# Patient Record
Sex: MALE | Race: WHITE | Employment: UNEMPLOYED | URBAN - NONMETROPOLITAN AREA
[De-identification: names, ages, dates, MRNs, and addresses within clinical notes are randomized per-mention and may not be internally consistent; named-entity substitution may affect disease eponyms.]

---

## 2017-07-24 ENCOUNTER — HOSPITAL ENCOUNTER (OUTPATIENT)
Dept: NEUROLOGY | Age: 47
Discharge: HOME | End: 2017-07-24
Payer: MEDICAID

## 2017-07-24 DIAGNOSIS — R20.2 NUMBNESS AND TINGLING IN BOTH HANDS: ICD-10-CM

## 2017-07-24 DIAGNOSIS — M54.2 PAIN, NECK: ICD-10-CM

## 2017-07-24 DIAGNOSIS — R53.1 WEAKNESS: ICD-10-CM

## 2017-07-24 DIAGNOSIS — R20.0 NUMBNESS AND TINGLING IN BOTH HANDS: ICD-10-CM

## 2017-07-24 PROCEDURE — 95886 MUSC TEST DONE W/N TEST COMP: CPT | Performed by: PSYCHIATRY & NEUROLOGY

## 2017-07-24 PROCEDURE — 95911 NRV CNDJ TEST 9-10 STUDIES: CPT

## 2017-07-24 PROCEDURE — 95886 MUSC TEST DONE W/N TEST COMP: CPT

## 2017-07-24 PROCEDURE — 95911 NRV CNDJ TEST 9-10 STUDIES: CPT | Performed by: PSYCHIATRY & NEUROLOGY

## 2017-09-06 ENCOUNTER — TRANSCRIBE ORDERS (OUTPATIENT)
Dept: ADMINISTRATIVE | Facility: HOSPITAL | Age: 47
End: 2017-09-06

## 2017-09-06 DIAGNOSIS — M54.12 CERVICAL RADICULOPATHY: ICD-10-CM

## 2017-09-06 DIAGNOSIS — M54.2 NECK PAIN: Primary | ICD-10-CM

## 2017-09-13 ENCOUNTER — HOSPITAL ENCOUNTER (OUTPATIENT)
Dept: CT IMAGING | Facility: HOSPITAL | Age: 47
Discharge: HOME | End: 2017-09-13
Admitting: PHYSICIAN ASSISTANT

## 2017-09-13 ENCOUNTER — HOSPITAL ENCOUNTER (OUTPATIENT)
Dept: MRI IMAGING | Facility: HOSPITAL | Age: 47
Discharge: HOME | End: 2017-09-13

## 2017-09-13 DIAGNOSIS — M54.12 CERVICAL RADICULOPATHY: ICD-10-CM

## 2017-09-13 DIAGNOSIS — M54.2 NECK PAIN: ICD-10-CM

## 2017-09-13 PROCEDURE — 72141 MRI NECK SPINE W/O DYE: CPT

## 2017-09-13 PROCEDURE — 72125 CT NECK SPINE W/O DYE: CPT

## 2018-05-26 ENCOUNTER — HOSPITAL ENCOUNTER (OUTPATIENT)
Dept: HOSPITAL 58 - AMBL | Age: 48
Discharge: ANOTHER HOSPITAL | End: 2018-05-26
Attending: INTERNAL MEDICINE

## 2018-05-26 ENCOUNTER — HOSPITAL ENCOUNTER (EMERGENCY)
Facility: HOSPITAL | Age: 48
Discharge: HOME | End: 2018-05-26
Admitting: EMERGENCY MEDICINE

## 2018-05-26 ENCOUNTER — APPOINTMENT (OUTPATIENT)
Dept: CT IMAGING | Facility: HOSPITAL | Age: 48
End: 2018-05-26

## 2018-05-26 VITALS
DIASTOLIC BLOOD PRESSURE: 65 MMHG | TEMPERATURE: 97.2 F | SYSTOLIC BLOOD PRESSURE: 119 MMHG | HEART RATE: 90 BPM | OXYGEN SATURATION: 96 % | HEIGHT: 67 IN | WEIGHT: 207.2 LBS | RESPIRATION RATE: 18 BRPM | BODY MASS INDEX: 32.52 KG/M2

## 2018-05-26 VITALS — BODY MASS INDEX: 33 KG/M2

## 2018-05-26 DIAGNOSIS — R00.0: ICD-10-CM

## 2018-05-26 DIAGNOSIS — R53.1: Primary | ICD-10-CM

## 2018-05-26 DIAGNOSIS — R55 NEAR SYNCOPE: Primary | ICD-10-CM

## 2018-05-26 DIAGNOSIS — R42: ICD-10-CM

## 2018-05-26 LAB
ALBUMIN SERPL-MCNC: 4.1 G/DL (ref 3.5–5)
ALBUMIN/GLOB SERPL: 1.5 G/DL (ref 1.1–2.5)
ALP SERPL-CCNC: 78 U/L (ref 24–120)
ALT SERPL W P-5'-P-CCNC: 22 U/L (ref 0–54)
AMPHET+METHAMPHET UR QL: NEGATIVE
ANION GAP SERPL CALCULATED.3IONS-SCNC: 9 MMOL/L (ref 4–13)
AST SERPL-CCNC: 15 U/L (ref 7–45)
BARBITURATES UR QL SCN: NEGATIVE
BASOPHILS # BLD AUTO: 0.06 10*3/MM3 (ref 0–0.2)
BASOPHILS NFR BLD AUTO: 0.7 % (ref 0–2)
BENZODIAZ UR QL SCN: NEGATIVE
BILIRUB SERPL-MCNC: 0.4 MG/DL (ref 0.1–1)
BILIRUB UR QL STRIP: NEGATIVE
BUN BLD-MCNC: 22 MG/DL (ref 5–21)
BUN/CREAT SERPL: 18.2 (ref 7–25)
CALCIUM SPEC-SCNC: 9.5 MG/DL (ref 8.4–10.4)
CANNABINOIDS SERPL QL: NEGATIVE
CHLORIDE SERPL-SCNC: 99 MMOL/L (ref 98–110)
CLARITY UR: CLEAR
CO2 SERPL-SCNC: 31 MMOL/L (ref 24–31)
COCAINE UR QL: NEGATIVE
COLOR UR: YELLOW
CREAT BLD-MCNC: 1.21 MG/DL (ref 0.5–1.4)
DEPRECATED RDW RBC AUTO: 38.8 FL (ref 40–54)
EOSINOPHIL # BLD AUTO: 0.28 10*3/MM3 (ref 0–0.7)
EOSINOPHIL NFR BLD AUTO: 3.1 % (ref 0–4)
ERYTHROCYTE [DISTWIDTH] IN BLOOD BY AUTOMATED COUNT: 12.4 % (ref 12–15)
GFR SERPL CREATININE-BSD FRML MDRD: 63 ML/MIN/1.73
GLOBULIN UR ELPH-MCNC: 2.8 GM/DL
GLUCOSE BLD-MCNC: 83 MG/DL (ref 70–100)
GLUCOSE UR STRIP-MCNC: ABNORMAL MG/DL
HCT VFR BLD AUTO: 43.7 % (ref 40–52)
HGB BLD-MCNC: 15.4 G/DL (ref 14–18)
HGB UR QL STRIP.AUTO: NEGATIVE
HOLD SPECIMEN: NORMAL
HOLD SPECIMEN: NORMAL
IMM GRANULOCYTES # BLD: 0.04 10*3/MM3 (ref 0–0.03)
IMM GRANULOCYTES NFR BLD: 0.4 % (ref 0–5)
KETONES UR QL STRIP: NEGATIVE
LEUKOCYTE ESTERASE UR QL STRIP.AUTO: NEGATIVE
LYMPHOCYTES # BLD AUTO: 2.61 10*3/MM3 (ref 0.72–4.86)
LYMPHOCYTES NFR BLD AUTO: 28.6 % (ref 15–45)
MAGNESIUM SERPL-MCNC: 2.1 MG/DL (ref 1.4–2.2)
MCH RBC QN AUTO: 30.4 PG (ref 28–32)
MCHC RBC AUTO-ENTMCNC: 35.2 G/DL (ref 33–36)
MCV RBC AUTO: 86.4 FL (ref 82–95)
METHADONE UR QL SCN: NEGATIVE
MONOCYTES # BLD AUTO: 0.59 10*3/MM3 (ref 0.19–1.3)
MONOCYTES NFR BLD AUTO: 6.5 % (ref 4–12)
NEUTROPHILS # BLD AUTO: 5.55 10*3/MM3 (ref 1.87–8.4)
NEUTROPHILS NFR BLD AUTO: 60.7 % (ref 39–78)
NITRITE UR QL STRIP: NEGATIVE
NRBC BLD MANUAL-RTO: 0 /100 WBC (ref 0–0)
OPIATES UR QL: NEGATIVE
PCP UR QL SCN: NEGATIVE
PH UR STRIP.AUTO: 6 [PH] (ref 5–8)
PLATELET # BLD AUTO: 194 10*3/MM3 (ref 130–400)
PMV BLD AUTO: 11.8 FL (ref 6–12)
POTASSIUM BLD-SCNC: 3.4 MMOL/L (ref 3.5–5.3)
PROT SERPL-MCNC: 6.9 G/DL (ref 6.3–8.7)
PROT UR QL STRIP: NEGATIVE
RBC # BLD AUTO: 5.06 10*6/MM3 (ref 4.8–5.9)
SODIUM BLD-SCNC: 139 MMOL/L (ref 135–145)
SP GR UR STRIP: 1.02 (ref 1–1.03)
TROPONIN I SERPL-MCNC: <0.012 NG/ML (ref 0–0.03)
TSH SERPL DL<=0.05 MIU/L-ACNC: 2.04 MIU/ML (ref 0.47–4.68)
UROBILINOGEN UR QL STRIP: ABNORMAL
WBC NRBC COR # BLD: 9.13 10*3/MM3 (ref 4.8–10.8)
WHOLE BLOOD HOLD SPECIMEN: NORMAL
WHOLE BLOOD HOLD SPECIMEN: NORMAL

## 2018-05-26 PROCEDURE — 99285 EMERGENCY DEPT VISIT HI MDM: CPT

## 2018-05-26 PROCEDURE — 80307 DRUG TEST PRSMV CHEM ANLYZR: CPT | Performed by: PHYSICIAN ASSISTANT

## 2018-05-26 PROCEDURE — 84443 ASSAY THYROID STIM HORMONE: CPT | Performed by: PHYSICIAN ASSISTANT

## 2018-05-26 PROCEDURE — 80053 COMPREHEN METABOLIC PANEL: CPT | Performed by: PHYSICIAN ASSISTANT

## 2018-05-26 PROCEDURE — 85025 COMPLETE CBC W/AUTO DIFF WBC: CPT | Performed by: PHYSICIAN ASSISTANT

## 2018-05-26 PROCEDURE — 83735 ASSAY OF MAGNESIUM: CPT | Performed by: PHYSICIAN ASSISTANT

## 2018-05-26 PROCEDURE — 93010 ELECTROCARDIOGRAM REPORT: CPT | Performed by: INTERNAL MEDICINE

## 2018-05-26 PROCEDURE — 70450 CT HEAD/BRAIN W/O DYE: CPT

## 2018-05-26 PROCEDURE — 84484 ASSAY OF TROPONIN QUANT: CPT | Performed by: PHYSICIAN ASSISTANT

## 2018-05-26 PROCEDURE — 81003 URINALYSIS AUTO W/O SCOPE: CPT | Performed by: PHYSICIAN ASSISTANT

## 2018-05-26 PROCEDURE — 93005 ELECTROCARDIOGRAM TRACING: CPT

## 2018-05-26 RX ORDER — SODIUM CHLORIDE 0.9 % (FLUSH) 0.9 %
10 SYRINGE (ML) INJECTION AS NEEDED
Status: DISCONTINUED | OUTPATIENT
Start: 2018-05-26 | End: 2018-05-27 | Stop reason: HOSPADM

## 2018-05-26 RX ORDER — GABAPENTIN 300 MG/1
300 CAPSULE ORAL ONCE
COMMUNITY

## 2018-05-26 RX ORDER — HYDROCHLOROTHIAZIDE 25 MG/1
25 TABLET ORAL DAILY
COMMUNITY

## 2018-05-26 RX ORDER — LISINOPRIL 20 MG/1
20 TABLET ORAL DAILY
COMMUNITY

## 2018-05-26 RX ORDER — DULOXETIN HYDROCHLORIDE 30 MG/1
30 CAPSULE, DELAYED RELEASE ORAL DAILY
COMMUNITY

## 2018-05-26 RX ORDER — ATORVASTATIN CALCIUM 20 MG/1
20 TABLET, FILM COATED ORAL DAILY
COMMUNITY

## 2018-06-04 NOTE — ED NOTES
"ED Call Back Questions    1. How are you doing since leaving the Emergency Department?    Doing better.  Will schedule with PCP.  Good ER visit.  2. Do you have any questions about your discharge instructions? No     3. Have you filled your new prescriptions yet? N/A  a. Do you have any questions about those medications? N/A    4. Were you able to make a follow-up appointment with the physician? No     5. Do you have a primary care physician? Yes   a. If No, would you like for me to set you up with one? No   i. If Yes, “I will have our ED  give you a call right back at this number to work with you on the best time for an appointment.”    6. We are always looking to get better at what we do. Do you have any suggestions for what we can do to be even better? No   a. If Yes, \"Thank you for sharing your concerns. I apologize. I will follow up with our manager and patient . Would you like someone to call you back?\" N/A    7. Is there anything else I can do for you? No     "

## 2018-10-19 ENCOUNTER — HOSPITAL ENCOUNTER (EMERGENCY)
Dept: HOSPITAL 58 - ED | Age: 48
Discharge: HOME | End: 2018-10-19

## 2018-10-19 VITALS — SYSTOLIC BLOOD PRESSURE: 100 MMHG | TEMPERATURE: 98.6 F | DIASTOLIC BLOOD PRESSURE: 67 MMHG

## 2018-10-19 VITALS — BODY MASS INDEX: 32 KG/M2

## 2018-10-19 DIAGNOSIS — R53.83: Primary | ICD-10-CM

## 2018-10-19 DIAGNOSIS — R53.1: ICD-10-CM

## 2018-10-19 DIAGNOSIS — Z79.899: ICD-10-CM

## 2018-10-19 PROCEDURE — 99283 EMERGENCY DEPT VISIT LOW MDM: CPT

## 2018-10-19 PROCEDURE — 80053 COMPREHEN METABOLIC PANEL: CPT

## 2018-10-19 PROCEDURE — 93005 ELECTROCARDIOGRAM TRACING: CPT

## 2018-10-19 PROCEDURE — 36415 COLL VENOUS BLD VENIPUNCTURE: CPT

## 2018-10-19 PROCEDURE — 85025 COMPLETE CBC W/AUTO DIFF WBC: CPT

## 2018-10-19 PROCEDURE — 84443 ASSAY THYROID STIM HORMONE: CPT

## 2018-10-19 PROCEDURE — 80306 DRUG TEST PRSMV INSTRMNT: CPT

## 2018-10-19 PROCEDURE — 83036 HEMOGLOBIN GLYCOSYLATED A1C: CPT

## 2018-10-19 PROCEDURE — 81001 URINALYSIS AUTO W/SCOPE: CPT

## 2018-10-19 PROCEDURE — 93010 ELECTROCARDIOGRAM REPORT: CPT

## 2019-04-07 NOTE — ED.PDOC
General


ED Provider: 


Dr. ADOLFO BLOCK-ER





Chief Complaint: Non-specific Complaint


Stated Complaint: my bp was up at home---


Time Seen by Physician: 19:20


Mode of Arrival: Walk-In


Information Source: Patient


Exam Limitations: No limitations


Primary Care Provider: 


SAEID WHITMORE





Nursing and Triage Documentation Reviewed and Agree: Yes


Does patient meet sepsis criteria?: No


System Inflammatory Response Syndrome: Not Applicable


Sepsis Protocol: 


For patient's 13 years and over:





Temp is 96.8 and below  and greater


Pulse >90 BPM


Resp >20/minute


Acutely Altered Mental Status





Are patient's symptoms suggestive of a new infection, such as:


   -Pneumonia


   -Skin, Soft Tissue


   -Endocarditis


   -UTI


   -Bone, Joint Infection


   -Implantable Device


   -Acute Abdominal Infection


   -Wound Infection


   -Meningitis


   -Blood Stream Catheter Infection


   -Unknown








Cardiovascular Complaint Exam





- Hypertension Complaint/Exam


Onset/Duration: today


Symptoms Are: Still present


Aggravating: Reports: None


Associated Signs and Symptoms: Reports: Anxiety.  Denies: Chest pain, Vision 

changes, Recent stress, Headache, Numbness, Tingling, Weakness, Dizziness, 

Short of air, Swelling


Recent Change in Medications: No


A/V [place]: No


Papilledema Present: No


JVD Present: No


Carotid Bruit Present: No


Femoral Pulses Bounding: No


Differential Diagnoses: Hypertension


Quality Indicator For Non-Traumatic Chest Pain/Syncope: EKG Performed





Review of Systems





- Review Of Systems


Constitutional: Reports: No symptoms


Eyes: Reports: No symptoms


Ears, Nose, Mouth, Throat: Reports: No symptoms


Respiratory: Reports: No symptoms


Cardiac: Reports: No symptoms


GI: Reports: No symptoms


: Reports: No symptoms


Musculoskeletal: Reports: No symptoms


Skin: Reports: No symptoms


Neurological: Reports: No symptoms


Endocrine: Reports: No symptoms


Hematologic/Lymphatic: Reports: No symptoms


All Other Systems: Reviewed and Negative





Past Medical History





- Past Medical History


Previously Healthy: No


Endocrine: Reports: None


Cardiovascular: Reports: None


Respiratory: Reports: Other


Hematological: Reports: None


Gastrointestinal: Reports: None


Genitourinary: Reports: None


Neuro/Psych: Reports: None


Musculoskeletal: Reports: Unknown


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


Other Pertinent Past Medical History: cancer





- Surgical History


General Surgical History: Reports: Tonsillectomy, Orthopedic (neck surgery), 

Other (EAR SURGERY)





- Family History


Family History: Reports: Unknown





- Social History


Smoking Status: Never smoker, Dips snuff


Hx Substance Use: No


Alcohol Screening: Occasionally





- Immunizations


Tetanus Shot up to Date: No (UNKNOWN)





Physical Exam





- Physical Exam


Appearance: Well-appearing, No pain distress, Well-nourished


Eyes: MOE, EOMI, Conjunctiva clear


ENT: Ears normal, Nose normal, Oropharynx normal


Neck: Supple


Respiratory: Airway patent, Breath sounds clear, Breath sounds equal, 

Respirations nonlabored


Cardiovascular: RRR, Pulses normal, No rub, No murmur


GI/: Soft


Musculoskeletal: Normal strength, ROM intact, No edema, No calf tenderness


Skin: Warm, Dry, Normal color


Neurological: Sensation intact, Motor intact, Reflexes intact, Cranial nerves 

intact, Alert, Oriented


Psychiatric: Affect appropriate, Mood appropriate





Interpretation





- EKG Interpretation


Time of EKG #1: 20:50


Rate: Normal


Rhythm: Sinus


Ectopy: None


Axis: NL


ST Segment: Normal


Interpretation: nsr





Critical Care Note





- Critical Care Note


Total Time (mins): 0





Course





- Course


Hematology/Chemistry: 


 04/07/19 19:50





 04/07/19 19:50


Orders, Labs, Meds: 





Lab Review











  04/07/19 04/07/19 04/07/19





  19:35 19:45 19:50


 


WBC    12.17 H


 


RBC    5.09


 


Hgb    15.2


 


Hct    44.1


 


MCV    86.6


 


MCH    29.9


 


MCHC    34.5


 


RDW Coeff of Ann    12.0


 


Plt Count    219


 


Immature Gran % (Auto)    0.2


 


Neut % (Auto)    58.8


 


Lymph % (Auto)    32.9


 


Mono % (Auto)    5.8


 


Eos % (Auto)    1.9


 


Baso % (Auto)    0.4


 


Immature Gran # (Auto)    0.0


 


Neut # (Auto)    7.2 H


 


Lymph # (Auto)    4.0 H


 


Mono # (Auto)    0.7


 


Eos # (Auto)    0.2


 


Baso # (Auto)    0.1


 


Sodium   


 


Potassium   


 


Chloride   


 


Carbon Dioxide   


 


Anion Gap   


 


BUN   


 


Creatinine   


 


Estimated GFR (MDRD)   


 


BUN/Creatinine Ratio   


 


Glucose   


 


Calcium   


 


Total Bilirubin   


 


AST   


 


ALT   


 


Alkaline Phosphatase   


 


Total Creatine Kinase   


 


Troponin I   


 


Total Protein   


 


Albumin   


 


Globulin   


 


Albumin/Globulin Ratio   


 


TSH   


 


Free T4   


 


Urine Color   Yellow 


 


Urine Clarity   Clear 


 


Urine pH   5.5 


 


Ur Specific Gravity   >=1.030 


 


Urine Protein   Trace 


 


Urine Glucose (UA)   Negative 


 


Urine Ketones   Trace 


 


Urine Blood   Negative 


 


Urine Nitrite   Negative 


 


Urine Bilirubin   Negative 


 


Urine Urobilinogen   0.2 


 


Ur Leukocyte Esterase   Negative 


 


Urine Microscopic WBC   0-2 


 


Ur Squamous Epith Cells   Not present 


 


Hyaline Casts   5-10 


 


Urine Mucus   Trace 


 


Influ A Molecular Assay  Negative by naat  


 


Influ B Molecular Assay  Negative by naat  














  04/07/19 04/07/19





  19:50 19:50


 


WBC  


 


RBC  


 


Hgb  


 


Hct  


 


MCV  


 


MCH  


 


MCHC  


 


RDW Coeff of Ann  


 


Plt Count  


 


Immature Gran % (Auto)  


 


Neut % (Auto)  


 


Lymph % (Auto)  


 


Mono % (Auto)  


 


Eos % (Auto)  


 


Baso % (Auto)  


 


Immature Gran # (Auto)  


 


Neut # (Auto)  


 


Lymph # (Auto)  


 


Mono # (Auto)  


 


Eos # (Auto)  


 


Baso # (Auto)  


 


Sodium  135.4 


 


Potassium  3.40 L 


 


Chloride  98.8 


 


Carbon Dioxide  25.5 


 


Anion Gap  14.50 


 


BUN  17.9 


 


Creatinine  1.19 H 


 


Estimated GFR (MDRD)  64.00 


 


BUN/Creatinine Ratio  15.04 


 


Glucose  88.2 


 


Calcium  9.67 


 


Total Bilirubin  0.42 


 


AST  11.4 L 


 


ALT  14.8 


 


Alkaline Phosphatase  86.7 


 


Total Creatine Kinase  99.4 


 


Troponin I  < 0.012 


 


Total Protein  7.00 


 


Albumin  4.78 


 


Globulin  2.22 


 


Albumin/Globulin Ratio  2.15 


 


TSH  1.390 


 


Free T4   1.06


 


Urine Color  


 


Urine Clarity  


 


Urine pH  


 


Ur Specific Gravity  


 


Urine Protein  


 


Urine Glucose (UA)  


 


Urine Ketones  


 


Urine Blood  


 


Urine Nitrite  


 


Urine Bilirubin  


 


Urine Urobilinogen  


 


Ur Leukocyte Esterase  


 


Urine Microscopic WBC  


 


Ur Squamous Epith Cells  


 


Hyaline Casts  


 


Urine Mucus  


 


Influ A Molecular Assay  


 


Influ B Molecular Assay  








Orders











 Category Date Time Status


 


 EKG-(ED ONLY) Stat CARDIO  04/07/19 19:32 Ordered


 


 Cardiac Monitor [ED CARDIAC MONITOR APPLIED] .ONCE EMERGENCY  04/07/19 19:32 

Active


 


 CBC W/ AUTO DIFF Stat LAB  04/07/19 19:50 Completed


 


 COMPREHENSIVE METABOLIC PANEL Stat LAB  04/07/19 19:50 Completed


 


 CREATINE KINASE Stat LAB  04/07/19 19:50 Completed


 


 FLU A/B MOLECULAR Stat LAB  04/07/19 19:35 Completed


 


 FREE T4 (FREE THYROXINE) Stat LAB  04/07/19 19:50 Completed


 


 MOLECULAR GROUP A STREP Stat LAB  04/07/19 19:45 Completed


 


 TROPONIN I Stat LAB  04/07/19 19:50 Completed


 


 TSH [THYROID STIMULATING HORMONE] Stat LAB  04/07/19 19:50 Completed


 


 URINALYSIS C & S IF INDICATED Stat LAB  04/07/19 19:45 Completed











Vital Signs: 





 











  Temp Pulse Resp BP Pulse Ox


 


 04/07/19 19:16  97.4 F L  102 H  18  117/78  95














BLACK Risk Score


BLACK Risk Score: 


Risk Score      Odds of death by 30D


      0                 0.1 (0.1-0.2)


      1                 0.3 (0.2-0.3)


      2                 0.4 (0.3-0.5)


      3                 0.7 (0.6-0.9)


      4                 1.2 (1.0-1.5)


      5                 2.2 (1.9-2.6)


      6                 3.0 (2.5-3.6)


      7                 4.8 (3.8-6.1)








Departure





- Departure


Time of Disposition: 20:51


Disposition: HOME SELF-CARE


Discharge Problem: 


 Elevated blood pressure reading





Instructions:  Chronic Hypertension (ED)


Condition: Good


Pt referred to PMD for follow-up: Yes


IPMP verified?: No


Additional Instructions: 


monitor bp at home---f/u with pcp


Allergies/Adverse Reactions: 


Allergies





mold Adverse Reaction (Uncoded 04/07/19 19:26)


 








Home Medications: 


Ambulatory Orders





Gabapentin 400 mg PO TID PRN 07/13/15 


Atorvastatin Calcium [Lipitor] 20 mg PO DAILY 10/19/18 


Clonazepam [Klonopin] 1 mg PO BEDTIME 12/19/18 


Duloxetine HCl 60 mg PO BEDTIME 12/19/18 


Hydroxyzine HCl 1 - 2 mg PO BEDTIME PRN #30 tab-cap 12/19/18 


Lisinopril 20 mg PO DAILY 12/19/18 


Ropinirole HCl [Ropinirole Er] 1 mg PO BEDTIME 12/19/18 


Dextroamphetamine/Amphetamine [Adderall 30 mg Tablet] 15 mg PO BID 04/07/19 








Disposition Discussed With: Patient, Family

## 2020-10-11 NOTE — ED.PDOC
General


ED Provider: 


Dr. ADOLFO BLOCK-ER





Chief Complaint: Non-specific Complaint


Stated Complaint: mandy been tired for a month


Time Seen by Physician: 21:22


Mode of Arrival: Walk-In


Information Source: Patient


Exam Limitations: No limitations


Primary Care Provider: 


NATALIA MYERS





Nursing and Triage Documentation Reviewed and Agree: Yes


Does patient meet sepsis criteria?: No


System Inflammatory Response Syndrome: Not Applicable


Sepsis Protocol: 


For patient's 13 years and over:





Temp is 96.8 and below  and greater


Pulse >90 BPM


Resp >20/minute


Acutely Altered Mental Status





Are patient's symptoms suggestive of a new infection, such as:


   -Pneumonia


   -Skin, Soft Tissue


   -Endocarditis


   -UTI


   -Bone, Joint Infection


   -Implantable Device


   -Acute Abdominal Infection


   -Wound Infection


   -Meningitis


   -Blood Stream Catheter Infection


   -Unknown








Miscellaneous Complaint Exam





- Complex/Multi-System Complaint/Exam


Onset/Duration: 4 weeks


Symptoms Are: Still present


Initial Severity: Mild


Current Severity: Mild


Location of Pain: no pain


Associated Signs and Symptoms: Reports: Weakness.  Denies: Decreased 

responsiveness, Confusion, Agitation, Dizziness, Syncope, Headache, Short of air

, Cough, Wheezing, Hemoptysis, Chest pain, Palpitations, Edema, Nausea, Vomiting

, Diarrhea, Abdominal pain, Back pain, Dysuria, Hematemesis, Melena, Decreased 

oral intake, Fever, Diaphoresis, Immunocompromised, Anticoagulation Therapy, 

Recent medication changes, Indwelling medical device, Prior MRSA, Prior VRE, 

Recent trauma, Remote trauma


Recent Echo/LV Function: No


JVD Present: No


Tachypnea Present: No


Stridor Present: No


Abdominal Findings: Present: Normal findings


Glascow Coma Scale (see protocol): 15


Meningeal Signs Positive: No


Focal Weakness: Present: None


Focal Sensory Loss: Present: None


Gait: Normal


Gag Reflex Present: Yes


Babinski Sign: Negative Right, Negative Left


Skin Findings: Present: Normal findings


Joint Swelling Present: No


In-Dwelling Device Present: No


Quality Indicator For Non-Traumatic Chest Pain/Syncope: EKG Performed





Review of Systems





- Review Of Systems


Constitutional: Reports: No symptoms


Eyes: Reports: No symptoms


Ears, Nose, Mouth, Throat: Reports: No symptoms


Respiratory: Reports: No symptoms, Other (snoring)


Cardiac: Reports: No symptoms


GI: Reports: No symptoms


: Reports: No symptoms


Musculoskeletal: Reports: No symptoms


Skin: Reports: No symptoms


Neurological: Reports: No symptoms


Endocrine: Reports: No symptoms


Hematologic/Lymphatic: Reports: No symptoms


All Other Systems: Reviewed and Negative





Past Medical History





- Past Medical History


Previously Healthy: No


Endocrine: Reports: None


Cardiovascular: Reports: None


Respiratory: Reports: Other


Hematological: Reports: None


Gastrointestinal: Reports: None


Genitourinary: Reports: None


Neuro/Psych: Reports: None


Musculoskeletal: Reports: Unknown


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


Other Pertinent Past Medical History: cancer





- Surgical History


General Surgical History: Reports: Tonsillectomy, Orthopedic (neck surgery), 

Other (EAR SURGERY)





- Family History


Family History: Reports: Unknown





- Social History


Smoking Status: Never smoker


Hx Substance Use: No


Alcohol Screening: Occasionally





- Immunizations


Tetanus Shot up to Date: Yes





Physical Exam





- Physical Exam


Appearance: Well-appearing, No pain distress, Well-nourished


Eyes: MOE, EOMI, Conjunctiva clear


ENT: Ears normal, Nose normal, Oropharynx normal


Neck: Supple


Respiratory: Airway patent, Breath sounds clear, Breath sounds equal, 

Respirations nonlabored


Cardiovascular: RRR, Pulses normal, No rub, No murmur


GI/: Soft, Nontender, No masses, Bowel sounds normal, No Organomegaly


Musculoskeletal: Normal strength, ROM intact, No edema, No calf tenderness


Skin: Warm, Dry, Normal color


Neurological: Sensation intact, Motor intact, Reflexes intact, Cranial nerves 

intact, Alert, Oriented


Psychiatric: Affect appropriate, Mood appropriate





Interpretation





- EKG Interpretation


Time of EKG #1: 21:25


Rate: Normal


Rhythm: Sinus


Ectopy: None


Axis: NL


ST Segment: Normal


Interpretation: normal sinus rythym





Critical Care Note





- Critical Care Note


Total Time (mins): 0





Course





- Course


Hematology/Chemistry: 


 10/19/18 21:20





 10/19/18 21:20


Orders, Labs, Meds: 


Lab Review











  10/19/18 10/19/18 10/19/18





  21:15 21:15 21:20


 


WBC    8.17


 


RBC    4.71


 


Hgb    14.3


 


Hct    41.2 L


 


MCV    87.5


 


MCH    30.4


 


MCHC    34.7


 


RDW Coeff of Ann    12.4


 


Plt Count    158


 


Immature Gran % (Auto)    0.4


 


Neut % (Auto)    53.4


 


Lymph % (Auto)    37.1


 


Mono % (Auto)    5.8


 


Eos % (Auto)    2.7


 


Baso % (Auto)    0.6


 


Immature Gran # (Auto)    0.0


 


Neut # (Auto)    4.4


 


Lymph # (Auto)    3.0


 


Mono # (Auto)    0.5


 


Eos # (Auto)    0.2


 


Baso # (Auto)    0.1


 


Sodium   


 


Potassium   


 


Chloride   


 


Carbon Dioxide   


 


Anion Gap   


 


BUN   


 


Creatinine   


 


Estimated GFR (MDRD)   


 


BUN/Creatinine Ratio   


 


Glucose   


 


Hemoglobin A1c   


 


Calcium   


 


Total Bilirubin   


 


AST   


 


ALT   


 


Alkaline Phosphatase   


 


Total Protein   


 


Albumin   


 


Globulin   


 


Albumin/Globulin Ratio   


 


TSH   


 


Urine Color   Yellow 


 


Urine Clarity   Clear 


 


Urine pH   7.0 


 


Ur Specific Gravity   1.025 


 


Urine Protein   Negative 


 


Urine Glucose (UA)   Negative 


 


Urine Ketones   Negative 


 


Urine Blood   Negative 


 


Urine Nitrite   Negative 


 


Urine Bilirubin   Negative 


 


Urine Urobilinogen   0.2 


 


Ur Leukocyte Esterase   Negative 


 


Urine Opiates Screen  Negative  


 


Ur Oxycodone Screen  Negative  


 


Urine Methadone Screen  Negative  


 


Ur Propoxyphene Screen  Negative  


 


Ur Barbiturates Screen  Negative  


 


U Tricyclic Antidepress  Negative  


 


Ur Phencyclidine Scrn  Negative  


 


Ur Amphetamine Screen  Negative  


 


U Methamphetamines Scrn  Negative  


 


U Benzodiazepines Scrn  Negative  


 


Urine Cocaine Screen  Negative  


 


U Cannabinoids Screen  Negative  














  10/19/18 10/19/18





  21:20 21:20


 


WBC  


 


RBC  


 


Hgb  


 


Hct  


 


MCV  


 


MCH  


 


MCHC  


 


RDW Coeff of Ann  


 


Plt Count  


 


Immature Gran % (Auto)  


 


Neut % (Auto)  


 


Lymph % (Auto)  


 


Mono % (Auto)  


 


Eos % (Auto)  


 


Baso % (Auto)  


 


Immature Gran # (Auto)  


 


Neut # (Auto)  


 


Lymph # (Auto)  


 


Mono # (Auto)  


 


Eos # (Auto)  


 


Baso # (Auto)  


 


Sodium  135.4 L 


 


Potassium  3.60 


 


Chloride  101.2 


 


Carbon Dioxide  28.0 


 


Anion Gap  9.80 


 


BUN  21.9 H 


 


Creatinine  1.09 


 


Estimated GFR (MDRD)  72.00 


 


BUN/Creatinine Ratio  20.09 


 


Glucose  122.3 H 


 


Hemoglobin A1c   5.37


 


Calcium  9.25 


 


Total Bilirubin  0.28 


 


AST  17.9 


 


ALT  15.2 


 


Alkaline Phosphatase  66.7 


 


Total Protein  6.50 


 


Albumin  4.16 


 


Globulin  2.34 


 


Albumin/Globulin Ratio  1.77 


 


TSH  1.440 


 


Urine Color  


 


Urine Clarity  


 


Urine pH  


 


Ur Specific Gravity  


 


Urine Protein  


 


Urine Glucose (UA)  


 


Urine Ketones  


 


Urine Blood  


 


Urine Nitrite  


 


Urine Bilirubin  


 


Urine Urobilinogen  


 


Ur Leukocyte Esterase  


 


Urine Opiates Screen  


 


Ur Oxycodone Screen  


 


Urine Methadone Screen  


 


Ur Propoxyphene Screen  


 


Ur Barbiturates Screen  


 


U Tricyclic Antidepress  


 


Ur Phencyclidine Scrn  


 


Ur Amphetamine Screen  


 


U Methamphetamines Scrn  


 


U Benzodiazepines Scrn  


 


Urine Cocaine Screen  


 


U Cannabinoids Screen  








Orders











 Category Date Time Status


 


 EKG-(ED ONLY) Stat CARDIO  10/19/18 20:58 Ordered


 


 CBC W/ AUTO DIFF Stat LAB  10/19/18 21:20 Completed


 


 COMPREHENSIVE METABOLIC PANEL Stat LAB  10/19/18 21:20 Completed


 


 HEMOGLOBIN A1C Stat LAB  10/19/18 21:20 Completed


 


 TSH [THYROID STIMULATING HORMONE] Stat LAB  10/19/18 21:20 Completed


 


 URINALYSIS C & S IF INDICATED Stat LAB  10/19/18 21:15 Completed


 


 URINE DRUG SCREEN (RAPID FOR ED) [DRUG SCREEN, URINE, LAB  10/19/18 21:15 

Completed





 RAPID] Stat   











Vital Signs: 


 











  Temp Pulse Resp BP Pulse Ox


 


 10/19/18 21:01  98.6 F  74  20  100/67  95














Departure





- Departure


Time of Disposition: 22:14


Disposition: HOME SELF-CARE


Discharge Problem: 


Fatigue


Qualifiers:


 Fatigue type: unspecified Qualified Code(s): R53.83 - Other fatigue





Instructions:  Fatigue (ED)


Condition: Good


Pt referred to PMD for follow-up: Yes


IPMP verified?: No


Additional Instructions: 


f/u with your pcp for further testing--consider sleep study with snoring and 

daytime fatigue


Allergies/Adverse Reactions: 


Allergies





mold Adverse Reaction (Uncoded 10/19/18 21:05)


 








Home Medications: 


Ambulatory Orders





Ropinirole HCl [Requip] 1 mg PO BEDTIME 03/31/15 


Bupropion HCl [Bupropion HCl Sr] 150 mg PO BID 07/13/15 


Gabapentin 400 mg PO TID PRN 07/13/15 


Escitalopram Oxalate [Lexapro] 10 mg PO DAILY #30 07/14/16 


Atorvastatin Calcium [Lipitor] 20 mg PO DAILY 10/19/18 


Hydrochlorothiazide 25 mg PO DAILY 10/19/18 








Disposition Discussed With: Patient, Family
Yes

## 2022-11-12 ENCOUNTER — APPOINTMENT (OUTPATIENT)
Dept: GENERAL RADIOLOGY | Facility: HOSPITAL | Age: 52
End: 2022-11-12

## 2022-11-12 ENCOUNTER — HOSPITAL ENCOUNTER (EMERGENCY)
Facility: HOSPITAL | Age: 52
Discharge: HOME | End: 2022-11-12
Admitting: FAMILY MEDICINE

## 2022-11-12 VITALS
HEART RATE: 100 BPM | BODY MASS INDEX: 27 KG/M2 | RESPIRATION RATE: 18 BRPM | OXYGEN SATURATION: 93 % | WEIGHT: 172 LBS | TEMPERATURE: 99 F | SYSTOLIC BLOOD PRESSURE: 146 MMHG | DIASTOLIC BLOOD PRESSURE: 78 MMHG | HEIGHT: 67 IN

## 2022-11-12 DIAGNOSIS — R11.0 NAUSEA: ICD-10-CM

## 2022-11-12 DIAGNOSIS — J10.1 INFLUENZA A: Primary | ICD-10-CM

## 2022-11-12 LAB
ALBUMIN SERPL-MCNC: 4.9 G/DL (ref 3.5–5.2)
ALBUMIN/GLOB SERPL: 2 G/DL
ALP SERPL-CCNC: 113 U/L (ref 39–117)
ALT SERPL W P-5'-P-CCNC: 7 U/L (ref 1–41)
ANION GAP SERPL CALCULATED.3IONS-SCNC: 10 MMOL/L (ref 5–15)
AST SERPL-CCNC: 8 U/L (ref 1–40)
BASOPHILS # BLD AUTO: 0.02 10*3/MM3 (ref 0–0.2)
BASOPHILS NFR BLD AUTO: 0.2 % (ref 0–1.5)
BILIRUB SERPL-MCNC: 0.7 MG/DL (ref 0–1.2)
BILIRUB UR QL STRIP: NEGATIVE
BUN SERPL-MCNC: 14 MG/DL (ref 6–20)
BUN/CREAT SERPL: 13.3 (ref 7–25)
CALCIUM SPEC-SCNC: 9.6 MG/DL (ref 8.6–10.5)
CHLORIDE SERPL-SCNC: 101 MMOL/L (ref 98–107)
CLARITY UR: CLEAR
CO2 SERPL-SCNC: 29 MMOL/L (ref 22–29)
COLOR UR: YELLOW
CREAT SERPL-MCNC: 1.05 MG/DL (ref 0.76–1.27)
DEPRECATED RDW RBC AUTO: 41.8 FL (ref 37–54)
EGFRCR SERPLBLD CKD-EPI 2021: 84.4 ML/MIN/1.73
EOSINOPHIL # BLD AUTO: 0.05 10*3/MM3 (ref 0–0.4)
EOSINOPHIL NFR BLD AUTO: 0.6 % (ref 0.3–6.2)
ERYTHROCYTE [DISTWIDTH] IN BLOOD BY AUTOMATED COUNT: 12.7 % (ref 12.3–15.4)
FLUAV RNA RESP QL NAA+PROBE: DETECTED
FLUBV RNA RESP QL NAA+PROBE: NOT DETECTED
GLOBULIN UR ELPH-MCNC: 2.5 GM/DL
GLUCOSE SERPL-MCNC: 115 MG/DL (ref 65–99)
GLUCOSE UR STRIP-MCNC: NEGATIVE MG/DL
HCT VFR BLD AUTO: 44.3 % (ref 37.5–51)
HGB BLD-MCNC: 14.8 G/DL (ref 13–17.7)
HGB UR QL STRIP.AUTO: NEGATIVE
IMM GRANULOCYTES # BLD AUTO: 0.02 10*3/MM3 (ref 0–0.05)
IMM GRANULOCYTES NFR BLD AUTO: 0.2 % (ref 0–0.5)
KETONES UR QL STRIP: NEGATIVE
LEUKOCYTE ESTERASE UR QL STRIP.AUTO: NEGATIVE
LYMPHOCYTES # BLD AUTO: 0.68 10*3/MM3 (ref 0.7–3.1)
LYMPHOCYTES NFR BLD AUTO: 7.9 % (ref 19.6–45.3)
MCH RBC QN AUTO: 30.1 PG (ref 26.6–33)
MCHC RBC AUTO-ENTMCNC: 33.4 G/DL (ref 31.5–35.7)
MCV RBC AUTO: 90.2 FL (ref 79–97)
MONOCYTES # BLD AUTO: 0.34 10*3/MM3 (ref 0.1–0.9)
MONOCYTES NFR BLD AUTO: 3.9 % (ref 5–12)
NEUTROPHILS NFR BLD AUTO: 7.55 10*3/MM3 (ref 1.7–7)
NEUTROPHILS NFR BLD AUTO: 87.2 % (ref 42.7–76)
NITRITE UR QL STRIP: NEGATIVE
NRBC BLD AUTO-RTO: 0 /100 WBC (ref 0–0.2)
PH UR STRIP.AUTO: 6.5 [PH] (ref 5–8)
PLATELET # BLD AUTO: 148 10*3/MM3 (ref 140–450)
PMV BLD AUTO: 11.2 FL (ref 6–12)
POTASSIUM SERPL-SCNC: 3.8 MMOL/L (ref 3.5–5.2)
PROT SERPL-MCNC: 7.4 G/DL (ref 6–8.5)
PROT UR QL STRIP: NEGATIVE
RBC # BLD AUTO: 4.91 10*6/MM3 (ref 4.14–5.8)
RSV RNA NPH QL NAA+NON-PROBE: NOT DETECTED
SARS-COV-2 RNA RESP QL NAA+PROBE: NOT DETECTED
SODIUM SERPL-SCNC: 140 MMOL/L (ref 136–145)
SP GR UR STRIP: 1.01 (ref 1–1.03)
UROBILINOGEN UR QL STRIP: NORMAL
WBC NRBC COR # BLD: 8.66 10*3/MM3 (ref 3.4–10.8)

## 2022-11-12 PROCEDURE — 85025 COMPLETE CBC W/AUTO DIFF WBC: CPT | Performed by: NURSE PRACTITIONER

## 2022-11-12 PROCEDURE — 96374 THER/PROPH/DIAG INJ IV PUSH: CPT

## 2022-11-12 PROCEDURE — 80053 COMPREHEN METABOLIC PANEL: CPT | Performed by: NURSE PRACTITIONER

## 2022-11-12 PROCEDURE — 25010000002 ONDANSETRON PER 1 MG: Performed by: NURSE PRACTITIONER

## 2022-11-12 PROCEDURE — 99283 EMERGENCY DEPT VISIT LOW MDM: CPT

## 2022-11-12 PROCEDURE — 81003 URINALYSIS AUTO W/O SCOPE: CPT | Performed by: NURSE PRACTITIONER

## 2022-11-12 PROCEDURE — 71045 X-RAY EXAM CHEST 1 VIEW: CPT

## 2022-11-12 PROCEDURE — 94799 UNLISTED PULMONARY SVC/PX: CPT

## 2022-11-12 PROCEDURE — 94640 AIRWAY INHALATION TREATMENT: CPT

## 2022-11-12 PROCEDURE — 87637 SARSCOV2&INF A&B&RSV AMP PRB: CPT | Performed by: NURSE PRACTITIONER

## 2022-11-12 RX ORDER — ALBUTEROL SULFATE 90 UG/1
2 AEROSOL, METERED RESPIRATORY (INHALATION) EVERY 4 HOURS PRN
Qty: 18 G | Refills: 0 | Status: SHIPPED | OUTPATIENT
Start: 2022-11-12

## 2022-11-12 RX ORDER — IPRATROPIUM BROMIDE AND ALBUTEROL SULFATE 2.5; .5 MG/3ML; MG/3ML
3 SOLUTION RESPIRATORY (INHALATION) ONCE
Status: COMPLETED | OUTPATIENT
Start: 2022-11-12 | End: 2022-11-12

## 2022-11-12 RX ORDER — ONDANSETRON 2 MG/ML
4 INJECTION INTRAMUSCULAR; INTRAVENOUS ONCE
Status: COMPLETED | OUTPATIENT
Start: 2022-11-12 | End: 2022-11-12

## 2022-11-12 RX ORDER — ONDANSETRON 4 MG/1
4 TABLET, ORALLY DISINTEGRATING ORAL EVERY 6 HOURS PRN
Qty: 10 TABLET | Refills: 0 | Status: SHIPPED | OUTPATIENT
Start: 2022-11-12

## 2022-11-12 RX ADMIN — ONDANSETRON 4 MG: 2 INJECTION INTRAMUSCULAR; INTRAVENOUS at 18:00

## 2022-11-12 RX ADMIN — IPRATROPIUM BROMIDE AND ALBUTEROL SULFATE 3 ML: .5; 3 SOLUTION RESPIRATORY (INHALATION) at 17:20

## 2022-11-12 RX ADMIN — SODIUM CHLORIDE, POTASSIUM CHLORIDE, SODIUM LACTATE AND CALCIUM CHLORIDE 1000 ML: 600; 310; 30; 20 INJECTION, SOLUTION INTRAVENOUS at 18:02

## 2022-11-12 NOTE — ED PROVIDER NOTES
Subjective   History of Present Illness  Patient is a 52-year-old white male presents emergency department with complaints of cough for the past 3 days.  He states the cough is got much worse since then.  He is started wheezing today.  He states he started having nausea with vomiting today as well.  He denies abdominal pain or diarrhea.  He is states that his temperature was 102 just prior to his arrival here the emergency department.  He has received his COVID-19 vaccination but has not yet received his influenza vaccine. He states that his cough has been nonproductive.     History provided by:  Patient   used: No        Review of Systems   Constitutional: Negative.    HENT: Negative.    Eyes: Negative.    Respiratory:        Patient is a 52-year-old white male presents emergency department with complaints of cough for the past 3 days.  He states the cough is got much worse since then.  He is started wheezing today.  He states he started having nausea with vomiting today as well.  He denies abdominal pain or diarrhea.  He is states that his temperature was 102 just prior to his arrival here the emergency department.  He has received his COVID-19 vaccination but has not yet received his influenza vaccine. He states that his cough has been nonproductive.      Cardiovascular: Negative.    Gastrointestinal: Positive for nausea and vomiting.   Endocrine: Negative.    Genitourinary: Negative.    Musculoskeletal: Negative.    Skin: Negative.    Allergic/Immunologic: Negative.    Neurological: Negative.    Hematological: Negative.    Psychiatric/Behavioral: Negative.    All other systems reviewed and are negative.      Past Medical History:   Diagnosis Date   • Anesthesia complication     PT STATES HE HAD TO BE CODED WITH ANESTHESIA IN PAST.  DIFFICULT TO AROUSE PER PT   • Anxiety    • Depression    • Hypertension    • Neck pain    • PONV (postoperative nausea and vomiting)    • Restless leg    •  Restless legs    • Skin cancer     EAR       No Known Allergies    Past Surgical History:   Procedure Laterality Date   • ANTERIOR CERVICAL DISCECTOMY W/ FUSION N/A 9/23/2016    Procedure: C 4-5 CERVICAL DISCECTOMY ANTERIOR FUSION WITH INSTRUMENTATION POSSIBLA REMOVAL INSTRUMENTATION C5-7 LANX WITH NEURO MONITORING;  Surgeon: JOVANA De La O MD;  Location: [facility] OR;  Service:    • NECK SURGERY     • NECK SURGERY     • TONSILLECTOMY     • TYMPANOPLASTY Right        Family History   Problem Relation Age of Onset   • COPD Mother    • Cancer Father    • Diabetes Maternal Grandfather    • COPD Paternal Grandmother        Social History     Socioeconomic History   • Marital status:    Tobacco Use   • Smoking status: Never   Substance and Sexual Activity   • Alcohol use: No   • Drug use: No   • Sexual activity: Defer       Prior to Admission medications    Medication Sig Start Date End Date Taking? Authorizing Provider   atorvastatin (LIPITOR) 20 MG tablet Take 20 mg by mouth Daily.    Jayesh Preciado MD   carisoprodol (SOMA) 350 MG tablet Take 1 tablet by mouth 3 (three) times a day as needed for muscle spasms. 9/24/16   JOVANA De La O MD   clonazePAM (KlonoPIN) 1 MG tablet Take 0.5 mg by mouth 3 (three) times a day as needed for anxiety.    ProviderJayesh MD   DULoxetine (CYMBALTA) 30 MG capsule Take 30 mg by mouth Daily.    Jayesh Preciado MD   escitalopram (LEXAPRO) 10 MG tablet Take 10 mg by mouth daily.    Jayesh Preciado MD   gabapentin (NEURONTIN) 300 MG capsule Take 300 mg by mouth 1 (One) Time.    Jayesh Preciado MD   hydrochlorothiazide (HYDRODIURIL) 25 MG tablet Take 25 mg by mouth Daily.    Jayesh Preciado MD   HYDROcodone-acetaminophen (NORCO) 5-325 MG per tablet Take 2 tablets by mouth every 4 (four) hours as needed for severe pain (7-10). 9/24/16   JOVANA De La O MD   lisinopril (PRINIVIL,ZESTRIL) 20 MG tablet Take 20 mg by mouth Daily.    Provider  "MD Jayesh   promethazine (PHENERGAN) 12.5 MG tablet Take 2 tablets by mouth every 8 (eight) hours as needed for nausea or vomiting. 9/24/16   JOVANA De La O MD   risperiDONE (RisperDAL) 2 MG tablet Take 1 mg by mouth daily.    Provider, MD Jayesh   rOPINIRole (REQUIP) 2 MG tablet Take 2 mg by mouth every night. RESTLESS LEGS 9/1/16   Provider, MD Jayesh       /78 (BP Location: Left arm, Patient Position: Lying)   Pulse 100   Temp 99 °F (37.2 °C) (Oral)   Resp 18   Ht 170.2 cm (67\")   Wt 78 kg (172 lb)   SpO2 93%   BMI 26.94 kg/m²     Objective   Physical Exam  Vitals and nursing note reviewed.   Constitutional:       Appearance: He is well-developed.      Comments: Non toxic appearing. resp even and unlab. Persistent cough throughout assessment    HENT:      Head: Normocephalic and atraumatic.   Eyes:      Conjunctiva/sclera: Conjunctivae normal.      Pupils: Pupils are equal, round, and reactive to light.   Cardiovascular:      Rate and Rhythm: Normal rate and regular rhythm.      Heart sounds: Normal heart sounds.   Pulmonary:      Effort: Pulmonary effort is normal.      Breath sounds: Wheezing present.      Comments: Exp wheezing noted throughout   Abdominal:      General: Bowel sounds are normal.      Palpations: Abdomen is soft.   Musculoskeletal:         General: Normal range of motion.      Cervical back: Normal range of motion and neck supple.   Skin:     General: Skin is warm and dry.      Comments: Mild pallor noted    Neurological:      Mental Status: He is alert and oriented to person, place, and time.      Deep Tendon Reflexes: Reflexes are normal and symmetric.   Psychiatric:         Behavior: Behavior normal.         Thought Content: Thought content normal.         Judgment: Judgment normal.         Procedures         Lab Results (last 24 hours)     Procedure Component Value Units Date/Time    COVID PRE-OP / PRE-PROCEDURE SCREENING ORDER (NO ISOLATION) - Swab, " Nasopharynx [[id number]]  (Abnormal) Collected: 11/12/22 1752    Specimen: Swab from Nasopharynx Updated: 11/12/22 1909    Narrative:      The following orders were created for panel order COVID PRE-OP / PRE-PROCEDURE SCREENING ORDER (NO ISOLATION) - Swab, Nasopharynx.  Procedure                               Abnormality         Status                     ---------                               -----------         ------                     COVID-19, FLU A/B, RSV P...[[id number]]  Abnormal            Final result                 Please view results for these tests on the individual orders.    COVID-19, FLU A/B, RSV PCR - Swab, Nasopharynx [[id number]]  (Abnormal) Collected: 11/12/22 1752    Specimen: Swab from Nasopharynx Updated: 11/12/22 1909     COVID19 Not Detected     Influenza A PCR Detected     Influenza B PCR Not Detected     RSV, PCR Not Detected    Narrative:      Fact sheet for providers: https://www.fda.gov/media/449303/download    Fact sheet for patients: https://www.fda.gov/media/026312/download    Test performed by PCR.    CBC & Differential [[id number]]  (Abnormal) Collected: 11/12/22 1800    Specimen: Blood Updated: 11/12/22 1821    Narrative:      The following orders were created for panel order CBC & Differential.  Procedure                               Abnormality         Status                     ---------                               -----------         ------                     CBC Auto Differential[[id number]]        Abnormal            Final result                 Please view results for these tests on the individual orders.    Comprehensive Metabolic Panel [[id number]]  (Abnormal) Collected: 11/12/22 1800    Specimen: Blood Updated: 11/12/22 1841     Glucose 115 mg/dL      BUN 14 mg/dL      Creatinine 1.05 mg/dL      Sodium 140 mmol/L      Potassium 3.8 mmol/L      Chloride 101 mmol/L      CO2 29.0 mmol/L      Calcium 9.6 mg/dL      Total Protein 7.4 g/dL      Albumin 4.90 g/dL      ALT  (SGPT) 7 U/L      AST (SGOT) 8 U/L      Alkaline Phosphatase 113 U/L      Total Bilirubin 0.7 mg/dL      Globulin 2.5 gm/dL      A/G Ratio 2.0 g/dL      BUN/Creatinine Ratio 13.3     Anion Gap 10.0 mmol/L      eGFR 84.4 mL/min/1.73      Comment: National Kidney Foundation and American Society of Nephrology (ASN) Task Force recommended calculation based on the Chronic Kidney Disease Epidemiology Collaboration (CKD-EPI) equation refit without adjustment for race.       Narrative:      GFR Normal >60  Chronic Kidney Disease <60  Kidney Failure <15      Urinalysis With Culture If Indicated - Urine, Clean Catch [[id number]]  (Normal) Collected: 11/12/22 1800    Specimen: Urine, Clean Catch Updated: 11/12/22 1824     Color, UA Yellow     Appearance, UA Clear     pH, UA 6.5     Specific Gravity, UA 1.014     Glucose, UA Negative     Ketones, UA Negative     Bilirubin, UA Negative     Blood, UA Negative     Protein, UA Negative     Leuk Esterase, UA Negative     Nitrite, UA Negative     Urobilinogen, UA 0.2 E.U./dL    Narrative:      In absence of clinical symptoms, the presence of pyuria, bacteria, and/or nitrites on the urinalysis result does not correlate with infection.  Urine microscopic not indicated.    CBC Auto Differential [[id number]]  (Abnormal) Collected: 11/12/22 1800    Specimen: Blood Updated: 11/12/22 1821     WBC 8.66 10*3/mm3      RBC 4.91 10*6/mm3      Hemoglobin 14.8 g/dL      Hematocrit 44.3 %      MCV 90.2 fL      MCH 30.1 pg      MCHC 33.4 g/dL      RDW 12.7 %      RDW-SD 41.8 fl      MPV 11.2 fL      Platelets 148 10*3/mm3      Neutrophil % 87.2 %      Lymphocyte % 7.9 %      Monocyte % 3.9 %      Eosinophil % 0.6 %      Basophil % 0.2 %      Immature Grans % 0.2 %      Neutrophils, Absolute 7.55 10*3/mm3      Lymphocytes, Absolute 0.68 10*3/mm3      Monocytes, Absolute 0.34 10*3/mm3      Eosinophils, Absolute 0.05 10*3/mm3      Basophils, Absolute 0.02 10*3/mm3      Immature Grans, Absolute 0.02  10*3/mm3      nRBC 0.0 /100 WBC           XR Chest 1 View   Final Result   1. Slight elevation left hemidiaphragm likely from the gas within the   stomach and splenic flexure of the colon. Minimal left basilar   atelectasis, otherwise no acute consolidation.   This report was finalized on 11/12/2022 17:27 by Dr. Samantha Sky MD.          ED Course  ED Course as of 11/13/22 0022   Sat Nov 12, 2022   1915 Pt influenza a is positive. Influenza b and covid 19 are negative. Other labs are unremarkable. Reeeval the pt- states that he is feeling much better after zofran and ivf. States that nausea has resolved. Pt will be sent home with zofran and inhaler. Advised to increase oral fluids. Advised to alternate tylenol with motrin every 4 hours as needed for fever and body aches. Follow up with pcp in 2 days if no improvement. Return before if symptoms worsen.  [CW]      ED Course User Index  [CW] Marita Yousif, JAMILAH          MDM  Number of Diagnoses or Management Options  Influenza A: minor  Nausea: minor     Amount and/or Complexity of Data Reviewed  Clinical lab tests: ordered and reviewed  Tests in the radiology section of CPT®: ordered and reviewed    Patient Progress  Patient progress: stable      Final diagnoses:   Influenza A   Nausea          Marita Yousif, JAMILAH  11/13/22 0022

## 2022-11-13 NOTE — DISCHARGE INSTRUCTIONS
Return to ER if symptoms worsen   Increase oral fluids  Alternate tylenol with motrin every 4 hours as needed for fever or body aches

## 2023-12-15 ENCOUNTER — HOSPITAL ENCOUNTER (EMERGENCY)
Facility: HOSPITAL | Age: 53
Discharge: HOME | End: 2023-12-15
Payer: COMMERCIAL

## 2023-12-15 ENCOUNTER — APPOINTMENT (OUTPATIENT)
Dept: GENERAL RADIOLOGY | Facility: HOSPITAL | Age: 53
End: 2023-12-15
Payer: COMMERCIAL

## 2023-12-15 VITALS
SYSTOLIC BLOOD PRESSURE: 108 MMHG | WEIGHT: 175 LBS | HEART RATE: 102 BPM | DIASTOLIC BLOOD PRESSURE: 66 MMHG | BODY MASS INDEX: 27.47 KG/M2 | RESPIRATION RATE: 16 BRPM | TEMPERATURE: 98.4 F | OXYGEN SATURATION: 96 % | HEIGHT: 67 IN

## 2023-12-15 DIAGNOSIS — J06.9 UPPER RESPIRATORY TRACT INFECTION, UNSPECIFIED TYPE: ICD-10-CM

## 2023-12-15 DIAGNOSIS — B34.0 ADENOVIRUS INFECTION: Primary | ICD-10-CM

## 2023-12-15 LAB
B PARAPERT DNA SPEC QL NAA+PROBE: NOT DETECTED
B PERT DNA SPEC QL NAA+PROBE: NOT DETECTED
C PNEUM DNA NPH QL NAA+NON-PROBE: NOT DETECTED
FLUAV SUBTYP SPEC NAA+PROBE: NOT DETECTED
FLUBV RNA ISLT QL NAA+PROBE: NOT DETECTED
HADV DNA SPEC NAA+PROBE: DETECTED
HCOV 229E RNA SPEC QL NAA+PROBE: NOT DETECTED
HCOV HKU1 RNA SPEC QL NAA+PROBE: NOT DETECTED
HCOV NL63 RNA SPEC QL NAA+PROBE: NOT DETECTED
HCOV OC43 RNA SPEC QL NAA+PROBE: NOT DETECTED
HMPV RNA NPH QL NAA+NON-PROBE: NOT DETECTED
HPIV1 RNA ISLT QL NAA+PROBE: NOT DETECTED
HPIV2 RNA SPEC QL NAA+PROBE: NOT DETECTED
HPIV3 RNA NPH QL NAA+PROBE: NOT DETECTED
HPIV4 P GENE NPH QL NAA+PROBE: NOT DETECTED
M PNEUMO IGG SER IA-ACNC: NOT DETECTED
RHINOVIRUS RNA SPEC NAA+PROBE: NOT DETECTED
RSV RNA NPH QL NAA+NON-PROBE: NOT DETECTED
S PYO AG THROAT QL: NEGATIVE
SARS-COV-2 RNA NPH QL NAA+NON-PROBE: NOT DETECTED

## 2023-12-15 PROCEDURE — 71046 X-RAY EXAM CHEST 2 VIEWS: CPT

## 2023-12-15 PROCEDURE — 99283 EMERGENCY DEPT VISIT LOW MDM: CPT

## 2023-12-15 PROCEDURE — 94640 AIRWAY INHALATION TREATMENT: CPT

## 2023-12-15 PROCEDURE — 87081 CULTURE SCREEN ONLY: CPT | Performed by: NURSE PRACTITIONER

## 2023-12-15 PROCEDURE — 87880 STREP A ASSAY W/OPTIC: CPT | Performed by: NURSE PRACTITIONER

## 2023-12-15 PROCEDURE — 0202U NFCT DS 22 TRGT SARS-COV-2: CPT | Performed by: STUDENT IN AN ORGANIZED HEALTH CARE EDUCATION/TRAINING PROGRAM

## 2023-12-15 RX ORDER — IPRATROPIUM BROMIDE AND ALBUTEROL SULFATE 2.5; .5 MG/3ML; MG/3ML
3 SOLUTION RESPIRATORY (INHALATION) ONCE
Status: COMPLETED | OUTPATIENT
Start: 2023-12-15 | End: 2023-12-15

## 2023-12-15 RX ORDER — DEXTROMETHORPHAN HYDROBROMIDE AND PROMETHAZINE HYDROCHLORIDE 15; 6.25 MG/5ML; MG/5ML
5 SYRUP ORAL 4 TIMES DAILY PRN
Qty: 180 ML | Refills: 0 | Status: SHIPPED | OUTPATIENT
Start: 2023-12-15

## 2023-12-15 RX ORDER — ALBUTEROL SULFATE 90 UG/1
2 AEROSOL, METERED RESPIRATORY (INHALATION) EVERY 4 HOURS PRN
Qty: 18 G | Refills: 0 | Status: SHIPPED | OUTPATIENT
Start: 2023-12-15

## 2023-12-15 RX ORDER — METHYLPREDNISOLONE 4 MG/1
TABLET ORAL
Qty: 21 EACH | Refills: 0 | Status: SHIPPED | OUTPATIENT
Start: 2023-12-15

## 2023-12-15 RX ADMIN — IPRATROPIUM BROMIDE AND ALBUTEROL SULFATE 3 ML: .5; 3 SOLUTION RESPIRATORY (INHALATION) at 21:44

## 2023-12-15 NOTE — Clinical Note
[facility] EMERGENCY DEPARTMENT  [address]  [facility] [address]  Phone: [phone]    Charlie Guzman was seen and treated in our emergency department on 12/15/2023.  He may return to work on 12/18/2023.         Thank you for choosing [facility].    Marita Yousif APRN

## 2023-12-16 NOTE — DISCHARGE INSTRUCTIONS
Return to ER if symptoms worsen   Increase oral fluids  Alternate tylenol with motrin every 4 hours as needed for fever

## 2023-12-16 NOTE — ED PROVIDER NOTES
Subjective   History of Present Illness  Patient is a 53-year-old male presents the emergency department with cough, congestion, generalized bodyaches, fever for the past 5 days.  He states his cough has been nonproductive.  He also complains of sore throat as well.  He states his cough became worse today that is the reason he came in.  He denies any nausea or vomiting.  No abdominal pain.  No chest pain or shortness of breath    History provided by:  Patient   used: No        Review of Systems   Constitutional: Negative.    HENT:  Positive for congestion and sore throat.    Eyes: Negative.    Respiratory:  Positive for cough.    Cardiovascular: Negative.    Gastrointestinal: Negative.    Endocrine: Negative.    Genitourinary: Negative.    Musculoskeletal: Negative.    Skin: Negative.    Allergic/Immunologic: Negative.    Neurological: Negative.    Hematological: Negative.    Psychiatric/Behavioral: Negative.     All other systems reviewed and are negative.      Past Medical History:   Diagnosis Date    Anesthesia complication     PT STATES HE HAD TO BE CODED WITH ANESTHESIA IN PAST.  DIFFICULT TO AROUSE PER PT    Anxiety     Depression     Hypertension     Neck pain     PONV (postoperative nausea and vomiting)     Restless leg     Restless legs     Skin cancer     EAR       No Known Allergies    Past Surgical History:   Procedure Laterality Date    ANTERIOR CERVICAL DISCECTOMY W/ FUSION N/A 9/23/2016    Procedure: C 4-5 CERVICAL DISCECTOMY ANTERIOR FUSION WITH INSTRUMENTATION POSSIBLA REMOVAL INSTRUMENTATION C5-7 LANX WITH NEURO MONITORING;  Surgeon: JOVANA De La O MD;  Location: [facility] OR;  Service:     NECK SURGERY      NECK SURGERY      TONSILLECTOMY      TYMPANOPLASTY Right        Family History   Problem Relation Age of Onset    COPD Mother     Cancer Father     Diabetes Maternal Grandfather     COPD Paternal Grandmother        Social History     Socioeconomic History    Marital status:     Tobacco Use    Smoking status: Never    Smokeless tobacco: Current     Types: Chew   Substance and Sexual Activity    Alcohol use: No    Drug use: No    Sexual activity: Defer       Prior to Admission medications    Medication Sig Start Date End Date Taking? Authorizing Provider   hydrochlorothiazide (HYDRODIURIL) 25 MG tablet Take 1 tablet by mouth Daily.   Yes Jayesh Preciado MD   HYDROcodone-acetaminophen (NORCO) 5-325 MG per tablet Take 2 tablets by mouth every 4 (four) hours as needed for severe pain (7-10). 9/24/16  Yes JOVANA De La O MD   atorvastatin (LIPITOR) 20 MG tablet Take 20 mg by mouth Daily.    Jayesh Preciado MD   albuterol sulfate  (90 Base) MCG/ACT inhaler Inhale 2 puffs Every 4 (Four) Hours As Needed for Wheezing. 11/12/22 12/15/23  Marita Yousif APRN   carisoprodol (SOMA) 350 MG tablet Take 1 tablet by mouth 3 (three) times a day as needed for muscle spasms. 9/24/16 12/15/23  JOVANA De La O MD   clonazePAM (KlonoPIN) 1 MG tablet Take 0.5 mg by mouth 3 (three) times a day as needed for anxiety.  12/15/23  Jayesh Preciado MD   DULoxetine (CYMBALTA) 30 MG capsule Take 30 mg by mouth Daily.  12/15/23  Jayesh Preciado MD   escitalopram (LEXAPRO) 10 MG tablet Take 10 mg by mouth daily.  12/15/23  Jayesh Preciado MD   gabapentin (NEURONTIN) 300 MG capsule Take 300 mg by mouth 1 (One) Time.  12/15/23  Jayesh Preciado MD   lisinopril (PRINIVIL,ZESTRIL) 20 MG tablet Take 1 tablet by mouth Daily.  12/15/23  Jayesh Preciado MD   ondansetron ODT (ZOFRAN-ODT) 4 MG disintegrating tablet Place 1 tablet on the tongue Every 6 (Six) Hours As Needed for Nausea. 11/12/22 12/15/23  Marita Yousif APRN   promethazine (PHENERGAN) 12.5 MG tablet Take 2 tablets by mouth every 8 (eight) hours as needed for nausea or vomiting. 9/24/16 12/15/23  JOVANA De La O MD   risperiDONE (RisperDAL) 2 MG tablet Take 1 mg by mouth daily.   "12/15/23  ProviderJayesh MD   rOPINIRole (REQUIP) 2 MG tablet Take 2 mg by mouth every night. RESTLESS LEGS 9/1/16 12/15/23  ProviderJayesh MD       /66 (BP Location: Right arm, Patient Position: Sitting)   Pulse 102   Temp 98.4 °F (36.9 °C) (Oral)   Resp 16   Ht 170.2 cm (67\")   Wt 79.4 kg (175 lb)   SpO2 96%   BMI 27.41 kg/m²     Objective   Physical Exam  Vitals and nursing note reviewed.   Constitutional:       Appearance: He is well-developed.      Comments: Nontoxic-appearing.  No acute distress.   HENT:      Head: Normocephalic and atraumatic.      Comments: Pharynx is slightly erythematous.  No exudate noted.  No signs of peritonsillar abscess.  Airway is intact.  Eyes:      Conjunctiva/sclera: Conjunctivae normal.      Pupils: Pupils are equal, round, and reactive to light.   Cardiovascular:      Rate and Rhythm: Normal rate and regular rhythm.      Heart sounds: Normal heart sounds.   Pulmonary:      Effort: Pulmonary effort is normal.      Comments: Mild expiratory wheezing noted throughout.  Abdominal:      General: Bowel sounds are normal.      Palpations: Abdomen is soft.   Musculoskeletal:         General: Normal range of motion.      Cervical back: Normal range of motion and neck supple.   Skin:     General: Skin is warm and dry.   Neurological:      Mental Status: He is alert and oriented to person, place, and time.      Deep Tendon Reflexes: Reflexes are normal and symmetric.   Psychiatric:         Behavior: Behavior normal.         Thought Content: Thought content normal.         Judgment: Judgment normal.         Procedures         Lab Results (last 24 hours)       Procedure Component Value Units Date/Time    Respiratory Panel PCR w/COVID-19(SARS-CoV-2) AGUSTÍN/NELLA/LISA/PAD/COR/DERRELL In-House, NP Swab in UTM/VTM, 2 HR TAT - Swab, Nasopharynx [[id number]]  (Abnormal) Collected: 12/15/23 2002    Specimen: Swab from Nasopharynx Updated: 12/15/23 2116     ADENOVIRUS, PCR Detected "     Coronavirus 229E Not Detected     Coronavirus HKU1 Not Detected     Coronavirus NL63 Not Detected     Coronavirus OC43 Not Detected     COVID19 Not Detected     Human Metapneumovirus Not Detected     Human Rhinovirus/Enterovirus Not Detected     Influenza A PCR Not Detected     Influenza B PCR Not Detected     Parainfluenza Virus 1 Not Detected     Parainfluenza Virus 2 Not Detected     Parainfluenza Virus 3 Not Detected     Parainfluenza Virus 4 Not Detected     RSV, PCR Not Detected     Bordetella pertussis pcr Not Detected     Bordetella parapertussis PCR Not Detected     Chlamydophila pneumoniae PCR Not Detected     Mycoplasma pneumo by PCR Not Detected    Narrative:      In the setting of a positive respiratory panel with a viral infection PLUS a negative procalcitonin without other underlying concern for bacterial infection, consider observing off antibiotics or discontinuation of antibiotics and continue supportive care. If the respiratory panel is positive for atypical bacterial infection (Bordetella pertussis, Chlamydophila pneumoniae, or Mycoplasma pneumoniae), consider antibiotic de-escalation to target atypical bacterial infection.    Rapid Strep A Screen - Swab, Throat [[id number]]  (Normal) Collected: 12/15/23 2153    Specimen: Swab from Throat Updated: 12/15/23 2207     Strep A Ag Negative    Beta Strep Culture, Throat - Swab, Throat [[id number]]  (Normal) Collected: 12/15/23 2153    Specimen: Swab from Throat Updated: 12/16/23 1234     Throat Culture, Beta Strep No Beta Hemolytic Streptococcus Isolated    Narrative:      Group A Strep incidence is low in adults. Positive culture for Beta hemolytic Streptococcus species can reflect colonization and not true infection. Please correlate clinically.            XR Chest 2 View   Final Result   1.  No acute findings.   2.  Chronic left hemidiaphragm elevation with left lower lobe   atelectasis.       This report was signed and finalized on 12/15/2023  9:38 PM by Dr. Jamar Avila MD.              ED Course  ED Course as of 12/16/23 1602   Fri Dec 15, 2023   2220 Reviewed results of testing with the patient.  Advised of adenovirus.  Chest x-ray is unremarkable.  Will send the patient home with steroids, Proventil inhaler, and cough syrup.  Advised to increase oral fluids.  Alternate Tylenol with Motrin every 4 hours as needed for fever.  Patient be discharged shortly in stable condition. [CW]   2222 The patient was diagnosed with upper respiratory infection and was not prescribed or dispensed an antibiotic.    [CW]      ED Course User Index  [CW] Marita Yousif APRN        Medical Decision Making  Patient is a 53-year-old male presents the emergency department with cough, congestion, generalized bodyaches, fever for the past 5 days.  He states his cough has been nonproductive.  He also complains of sore throat as well.  He states his cough became worse today that is the reason he came in.  He denies any nausea or vomiting.  No abdominal pain.  No chest pain or shortness of breath  Course of treatment in the ED: Nontoxic-appearing 53-year-old male presents emergency department with cough, congestion, sore throat for the past 5 days.  He states the cough was worse today and that was the reason he came in.  Oropharynx is slightly erythematous with thick clear postnasal drainage.  No signs of peritonsillar abscess.  Airway is intact.  Lungs expiratory wheezing noted.  Labs and chest x-ray have been ordered.  Differential diagnosis: Viral illness; COVID; pneumonia; strep pharyngitis  Labs Reviewed  RESPIRATORY PANEL PCR W/ COVID-19 (SARS-COV-2), NP SWAB IN [facility], 2 HR TAT - Abnormal; Notable for the following components:     ADENOVIRUS, PCR               Detected (*)            All other components within normal limits         Narrative: In the setting of a positive respiratory panel with a viral infection PLUS a negative procalcitonin without other  underlying concern for bacterial infection, consider observing off antibiotics or discontinuation of antibiotics and continue supportive care. If the respiratory panel is positive for atypical bacterial infection (Bordetella pertussis, Chlamydophila pneumoniae, or Mycoplasma pneumoniae), consider antibiotic de-escalation to target atypical bacterial infection.  RAPID STREP A SCREEN - Normal  BETA HEMOLYTIC STREP CULTURE, THROAT  XR Chest 2 View   Final Result    1.  No acute findings.    2.  Chronic left hemidiaphragm elevation with left lower lobe    atelectasis.         This report was signed and finalized on 12/15/2023 9:38 PM by Dr. Jamar Avila MD.          Patient is positive for adenovirus.  Chest x-ray is negative for any acute pneumonia.  Will send the patient home with steroids, Proventil inhaler, and Phenergan DM cough syrup.  Advised increase oral fluids.  Alternate Tylenol with Motrin every 4 hours as needed for fever.  Advised to follow-up with his primary care doctor on Monday.  Return to emergency department before symptoms worsen.  Differential dx: covid; influenza; pneumonia; bronchitis; uri     Problems Addressed:  Adenovirus infection: complicated acute illness or injury  Upper respiratory tract infection, unspecified type: complicated acute illness or injury    Amount and/or Complexity of Data Reviewed  Labs: ordered. Decision-making details documented in ED Course.  Radiology: ordered. Decision-making details documented in ED Course.    Risk  Prescription drug management.         Final diagnoses:   Adenovirus infection   Upper respiratory tract infection, unspecified type          Marita Yousif, APRN  12/16/23 4277

## 2023-12-17 LAB — BACTERIA SPEC AEROBE CULT: NORMAL

## 2024-05-21 ENCOUNTER — APPOINTMENT (OUTPATIENT)
Dept: GENERAL RADIOLOGY | Facility: HOSPITAL | Age: 54
End: 2024-05-21
Payer: COMMERCIAL

## 2024-05-21 ENCOUNTER — HOSPITAL ENCOUNTER (EMERGENCY)
Facility: HOSPITAL | Age: 54
Discharge: HOME | End: 2024-05-21
Admitting: EMERGENCY MEDICINE
Payer: COMMERCIAL

## 2024-05-21 VITALS
TEMPERATURE: 98.2 F | BODY MASS INDEX: 26.84 KG/M2 | SYSTOLIC BLOOD PRESSURE: 124 MMHG | WEIGHT: 171 LBS | HEART RATE: 76 BPM | OXYGEN SATURATION: 98 % | RESPIRATION RATE: 18 BRPM | HEIGHT: 67 IN | DIASTOLIC BLOOD PRESSURE: 94 MMHG

## 2024-05-21 DIAGNOSIS — M25.561 CHRONIC PAIN OF RIGHT KNEE: Primary | ICD-10-CM

## 2024-05-21 DIAGNOSIS — G89.29 CHRONIC PAIN OF RIGHT KNEE: Primary | ICD-10-CM

## 2024-05-21 PROCEDURE — 73562 X-RAY EXAM OF KNEE 3: CPT

## 2024-05-21 PROCEDURE — 99283 EMERGENCY DEPT VISIT LOW MDM: CPT

## 2024-05-21 RX ORDER — HYDROCODONE BITARTRATE AND ACETAMINOPHEN 7.5; 325 MG/1; MG/1
1 TABLET ORAL EVERY 6 HOURS PRN
COMMUNITY

## 2024-05-21 RX ORDER — TRAZODONE HYDROCHLORIDE 50 MG/1
50 TABLET ORAL NIGHTLY
COMMUNITY

## 2024-05-21 NOTE — ED PROVIDER NOTES
Subjective   History of Present Illness  Patient is a 54-year-old male who presents to the ER with chronic right knee pain.  Patient states he injured it  6 years ago.  Patient denies any recent or new injuries to it.  Patient states that it hurts when he squats down or bends it at all.  Patient denies any tingling or numbness.  Patient is able to ambulate on his own.  Patient has never seen an orthopedist for this injury.        Review of Systems   Musculoskeletal:         Right knee pain   All other systems reviewed and are negative.      Past Medical History:   Diagnosis Date    Anesthesia complication     PT STATES HE HAD TO BE CODED WITH ANESTHESIA IN PAST.  DIFFICULT TO AROUSE PER PT    Anxiety     Daytime somnolence     Depression     Hyperlipidemia     Hypertension     Infectious viral hepatitis     Insomnia     Neck pain     PONV (postoperative nausea and vomiting)     Restless leg     Restless legs     Skin cancer     EAR    Spinal stenosis     Vitamin D deficiency        No Known Allergies    Past Surgical History:   Procedure Laterality Date    ANTERIOR CERVICAL DISCECTOMY W/ FUSION N/A 9/23/2016    Procedure: C 4-5 CERVICAL DISCECTOMY ANTERIOR FUSION WITH INSTRUMENTATION POSSIBLA REMOVAL INSTRUMENTATION C5-7 LANX WITH NEURO MONITORING;  Surgeon: JOVANA De La O MD;  Location: [facility] OR;  Service:     NECK SURGERY      NECK SURGERY      TONSILLECTOMY      TYMPANOPLASTY Right        Family History   Problem Relation Age of Onset    COPD Mother     Cancer Father     Diabetes Maternal Grandfather     COPD Paternal Grandmother        Social History     Socioeconomic History    Marital status:    Tobacco Use    Smoking status: Never    Smokeless tobacco: Current     Types: Chew   Substance and Sexual Activity    Alcohol use: No    Drug use: No    Sexual activity: Defer           Objective   Physical Exam  Vitals and nursing note reviewed.   Constitutional:       General: He is not in acute  distress.     Appearance: Normal appearance. He is not toxic-appearing or diaphoretic.   HENT:      Head: Normocephalic and atraumatic.      Right Ear: External ear normal.      Left Ear: External ear normal.      Nose: Nose normal.      Mouth/Throat:      Mouth: Mucous membranes are moist.   Eyes:      General:         Right eye: No discharge.         Left eye: No discharge.      Extraocular Movements: Extraocular movements intact.      Conjunctiva/sclera: Conjunctivae normal.   Cardiovascular:      Rate and Rhythm: Normal rate.   Pulmonary:      Effort: Pulmonary effort is normal. No respiratory distress.      Breath sounds: No rhonchi.   Abdominal:      General: Abdomen is flat.      Tenderness: There is no abdominal tenderness. There is no guarding or rebound.   Musculoskeletal:         General: No swelling, tenderness, deformity or signs of injury. Normal range of motion.      Cervical back: Normal range of motion.      Right lower leg: No edema.      Left lower leg: No edema.   Skin:     General: Skin is warm and dry.      Capillary Refill: Capillary refill takes less than 2 seconds.      Coloration: Skin is not jaundiced.   Neurological:      General: No focal deficit present.      Mental Status: He is alert and oriented to person, place, and time. Mental status is at baseline.   Psychiatric:         Mood and Affect: Mood normal.         Behavior: Behavior normal.         Thought Content: Thought content normal.         Judgment: Judgment normal.         Procedures           ED Course                                             Medical Decision Making  History of Present Illness  Patient is a 54-year-old male who presents to the ER with chronic right knee pain.  Patient states he injured it  6 years ago.  Patient denies any recent or new injuries to it.  Patient states that it hurts when he squats down or bends it at all.  Patient denies any tingling or numbness.  Patient is able to ambulate on his own.   Patient has never seen an orthopedist for this injury.    Differential diagnosis: Knee strain, patella fracture, dislocation and others    XR Knee 3 View Right   Final Result    1. No acute osseous pathology of the right knee.         Comments: If knee pain persists, follow-up outpatient nonemergent MRI    exam could be obtained.              This report was signed and finalized on 5/21/2024 4:04 PM by Dr. Samantha Sky MD.       Imaging shows no acute fractures.  Patient I discussed this chronic issue best followed up with his primary care with possibility of referral to orthopedist.  Patient gave verbal understanding.  Patient will discharge home at this time.    Problems Addressed:  Chronic pain of right knee: complicated acute illness or injury    Amount and/or Complexity of Data Reviewed  Radiology: ordered.        Final diagnoses:   Chronic pain of right knee       ED Disposition  ED Disposition       ED Disposition   Discharge    Condition   Stable    Comment   --               Dora Pinedo, PA  [address] [place] [address]  [phone]    Schedule an appointment as soon as possible for a visit       [facility] EMERGENCY DEPARTMENT  [address] [place] [id number] [phone]    If symptoms worsen         Medication List      No changes were made to your prescriptions during this visit.            Anca Sanchez, APRN  05/21/24 7727

## 2024-05-21 NOTE — DISCHARGE INSTRUCTIONS
Today you were seen for your right knee pain. It does not appear to be any new injuries, you do need to follow-up with your primary care they may want to refer you to an orthopedist.  If you start having new or worsening symptoms please return back to the ER immediately for further evaluation.  Take Tylenol ibuprofen as needed for pain use heat and ice to help with pain as well.

## 2025-05-15 ENCOUNTER — APPOINTMENT (OUTPATIENT)
Dept: GENERAL RADIOLOGY | Facility: HOSPITAL | Age: 55
End: 2025-05-15
Payer: COMMERCIAL

## 2025-05-15 ENCOUNTER — HOSPITAL ENCOUNTER (EMERGENCY)
Facility: HOSPITAL | Age: 55
Discharge: HOME | End: 2025-05-15
Payer: COMMERCIAL

## 2025-05-15 VITALS
TEMPERATURE: 98.5 F | OXYGEN SATURATION: 98 % | SYSTOLIC BLOOD PRESSURE: 115 MMHG | BODY MASS INDEX: 25.74 KG/M2 | HEART RATE: 75 BPM | WEIGHT: 164 LBS | RESPIRATION RATE: 14 BRPM | DIASTOLIC BLOOD PRESSURE: 61 MMHG | HEIGHT: 67 IN

## 2025-05-15 DIAGNOSIS — M79.672 LEFT FOOT PAIN: Primary | ICD-10-CM

## 2025-05-15 PROCEDURE — 99283 EMERGENCY DEPT VISIT LOW MDM: CPT

## 2025-05-15 PROCEDURE — 73630 X-RAY EXAM OF FOOT: CPT

## 2025-05-15 NOTE — ED PROVIDER NOTES
Subjective   History of Present Illness  Patient is a 55-year-old male who presents emergency department today for complaint of left foot pain for x 1 month.  He states that he has not injured his foot.  He states he just began hurting.  He does not have any history of gout.  He denies any other complaints at this time.  Denies any chest pain, fever, chills, nausea, vomiting, diarrhea, and other symptoms.  Patient has a past medical history of anesthesia complication, anxiety, depression, hyperlipidemia, hypertension, infectious viral hepatitis, insomnia, neck pain, MIKEL V, restless leg, skin cancer, spinal stenosis, and vitamin D deficiency.        Review of Systems   Musculoskeletal:  Positive for arthralgias.        Left foot pain   All other systems reviewed and are negative.      Past Medical History:   Diagnosis Date    Anesthesia complication     PT STATES HE HAD TO BE CODED WITH ANESTHESIA IN PAST.  DIFFICULT TO AROUSE PER PT    Anxiety     Daytime somnolence     Depression     Hyperlipidemia     Hypertension     Infectious viral hepatitis     Insomnia     Neck pain     PONV (postoperative nausea and vomiting)     Restless leg     Restless legs     Skin cancer     EAR    Spinal stenosis     Vitamin D deficiency        No Known Allergies    Past Surgical History:   Procedure Laterality Date    ANTERIOR CERVICAL DISCECTOMY W/ FUSION N/A 9/23/2016    Procedure: C 4-5 CERVICAL DISCECTOMY ANTERIOR FUSION WITH INSTRUMENTATION POSSIBLA REMOVAL INSTRUMENTATION C5-7 LANX WITH NEURO MONITORING;  Surgeon: JOVANA De La O MD;  Location: [facility] OR;  Service:     NECK SURGERY      NECK SURGERY      TONSILLECTOMY      TYMPANOPLASTY Right        Family History   Problem Relation Age of Onset    COPD Mother     Cancer Father     Diabetes Maternal Grandfather     COPD Paternal Grandmother        Social History     Socioeconomic History    Marital status:    Tobacco Use    Smoking status: Never    Smokeless tobacco:  Current     Types: Chew   Substance and Sexual Activity    Alcohol use: No    Drug use: No    Sexual activity: Defer           Objective   Physical Exam  Vitals and nursing note reviewed.   Constitutional:       General: He is not in acute distress.     Appearance: Normal appearance. He is normal weight. He is not ill-appearing, toxic-appearing or diaphoretic.   HENT:      Head: Normocephalic and atraumatic.      Right Ear: External ear normal.      Left Ear: External ear normal.      Nose: Nose normal. No congestion or rhinorrhea.      Mouth/Throat:      Mouth: Mucous membranes are moist.      Pharynx: Oropharynx is clear. No oropharyngeal exudate or posterior oropharyngeal erythema.   Eyes:      Extraocular Movements: Extraocular movements intact.      Conjunctiva/sclera: Conjunctivae normal.   Cardiovascular:      Rate and Rhythm: Normal rate and regular rhythm.      Pulses: Normal pulses.      Heart sounds: Normal heart sounds. No murmur heard.     No gallop.   Pulmonary:      Effort: Pulmonary effort is normal. No respiratory distress.      Breath sounds: Normal breath sounds. No stridor. No wheezing, rhonchi or rales.   Chest:      Chest wall: No tenderness.   Abdominal:      General: Abdomen is flat. Bowel sounds are normal. There is no distension.      Palpations: Abdomen is soft. There is no mass.      Tenderness: There is no abdominal tenderness. There is no right CVA tenderness, left CVA tenderness, guarding or rebound.      Hernia: No hernia is present.   Musculoskeletal:         General: Tenderness present. No swelling, deformity or signs of injury. Normal range of motion.      Cervical back: Normal range of motion and neck supple. No rigidity or tenderness.      Right lower leg: No edema.      Left lower leg: No tenderness or bony tenderness. No edema.      Left ankle: Normal pulse.      Left foot: Normal capillary refill. Tenderness present. No bony tenderness. Normal pulse.      Comments: Redness and  mild swelling to the outer lateral aspect of the left foot.  Range of motion appropriate, pulses are present palpable, appropriate capillary refill.   Lymphadenopathy:      Cervical: No cervical adenopathy.   Skin:     General: Skin is warm and dry.      Capillary Refill: Capillary refill takes less than 2 seconds.      Coloration: Skin is not jaundiced or pale.      Findings: No bruising, erythema, lesion or rash.   Neurological:      General: No focal deficit present.      Mental Status: He is alert and oriented to person, place, and time. Mental status is at baseline.      Cranial Nerves: No cranial nerve deficit.      Sensory: No sensory deficit.      Motor: No weakness.      Coordination: Coordination normal.      Gait: Gait normal.      Deep Tendon Reflexes: Reflexes normal.   Psychiatric:         Mood and Affect: Mood normal.         Behavior: Behavior normal.         Thought Content: Thought content normal.         Judgment: Judgment normal.         Procedures           ED Course  ED Course as of 05/15/25 1302   Thu May 15, 2025   1058 Left foot x-ray ordered. [BS]   1101 Patient has been ambulatory.  Neurologically intact.  Pulses are present palpable bilateral feet.  No decreased capillary refill.  Range of motion is appropriate. [BS]   1139 X-ray left foot  IMPRESSION:  1. Unremarkable radiographs of the left foot.   [BS]   1141 Discussed case with Dr. Pepper.  Patient is ambulatory, there is no pain in the arch of the foot or signs and symptoms of infectious process.  Does not appear to be gout, plantar fasciitis, or bunion.  Instructed to follow-up with PCP as well as podiatry.  I did provide information for the patient to follow-up with him.  Instructed him to come back to the ER with any new or worsening symptoms.  Patient will be discharged home in stable condition. [BS]      ED Course User Index  [BS] Jael Grier, APRN                                                       Medical Decision  Making  Problems Addressed:  Left foot pain: complicated acute illness or injury    Amount and/or Complexity of Data Reviewed  Radiology: ordered.    Patient is a 55-year-old male who presents emergency department today for complaint of left foot pain for x 1 month.  He states that he has not injured his foot.  He states he just began hurting.  He does not have any history of gout.  He denies any other complaints at this time.  Denies any chest pain, fever, chills, nausea, vomiting, diarrhea, and other symptoms.  Patient has a past medical history of anesthesia complication, anxiety, depression, hyperlipidemia, hypertension, infectious viral hepatitis, insomnia, neck pain, MIKEL V, restless leg, skin cancer, spinal stenosis, and vitamin D deficiency.    Differential diagnoses include but are not limited to foot fracture, gout, bunion, and other etiologies.    XR Foot 3+ View Left   Final Result   1. Unremarkable radiographs of the left foot.       This report was signed and finalized on 5/15/2025 11:27 AM by Dr. Jai Del Castillo MD.          Patient is a 55-year-old male who presents emergency department today for complaint of left foot pain for x 1 month.  He states that he has not injured his foot.  He states he just began hurting.  He does not have any history of gout.  He denies any other complaints at this time.  On exam normal appearance, normal weight, non-ill-appearing, nontoxic-appearing, nondiaphoretic.  No acute distress. Redness and mild swelling to the outer lateral aspect of the left foot.  Range of motion appropriate, pulses are present palpable, appropriate capillary refill.  Left x-ray foot unremarkable radiographs of the left foot. Patient has been ambulatory.  Neurologically intact.  Pulses are present palpable bilateral feet.  No decreased capillary refill.  Range of motion is appropriate. Discussed case with Dr. Pepper.  Patient is ambulatory, there is no pain in the arch of the foot or signs  and symptoms of infectious process.  Does not appear to be gout, plantar fasciitis, or bunion.  Instructed to follow-up with PCP as well as podiatry.  I did provide information for the patient to follow-up with him.  Instructed him to come back to the ER with any new or worsening symptoms.  Patient will be discharged home in stable condition.      Final diagnoses:   Left foot pain       ED Disposition  ED Disposition       ED Disposition   Discharge    Condition   Stable    Comment   --               Dora Pinedo, PA  [address] [place] [address]  [phone]    Schedule an appointment as soon as possible for a visit in 2 days  follow up    [facility] EMERGENCY DEPARTMENT  [address] [facility] [address] [phone]    If symptoms worsen, As needed    Chadwick Frederick, DPM  [address] [facility] [address] [facility] [address]  [facility] [id number] [phone]    Schedule an appointment as soon as possible for a visit in 2 days  follow up, As needed         Medication List      No changes were made to your prescriptions during this visit.            Jael Grier, APRN  05/15/25 1309

## 2025-05-15 NOTE — DISCHARGE INSTRUCTIONS
It was very nice to meet you, Charlie. Thank you for allowing us to take care of you today at [facility].    Your x-ray does not show any fracture today.  You can use Tylenol and ibuprofen for pain and inflammation.  You can follow-up with PCP as well as podiatry.  I did attach their information below.  Come back to the ER with any new or worsening symptoms.    Please understand that an ER evaluation is just the start of your evaluation. We will do what we can, but we are often unable to fully figure out what is causing your symptoms from one evaluation. Thus, our primary goal is to determine whether you need to be evaluated in the hospital or if it is safe for you to go home and see other doctors such as a primary care physician or a specialist on an outpatient basis.     Like we discussed, it is VERY IMPORTANT that you follow up with your primary care doctor (call them to set up an appointment) within the next few days or as soon as possible so that you can be re-evaluated for improvement in your symptoms or for any other questions.     Please return to the emergency room within 12-48 hours if you experience fever, chills, chest pain or shortness of breath, pain with inspiration/expiration, pain that travels to your arms, neck or back, nausea, vomiting, severe headache, tearing pain in your chest, dizziness, feel as though you are about to pass out, have any worsening symptoms, or any other concerns.

## 2025-08-11 ENCOUNTER — HOSPITAL ENCOUNTER (EMERGENCY)
Facility: HOSPITAL | Age: 55
Discharge: HOME | End: 2025-08-11
Attending: EMERGENCY MEDICINE | Admitting: EMERGENCY MEDICINE
Payer: COMMERCIAL

## 2025-08-11 VITALS
HEART RATE: 86 BPM | HEIGHT: 67 IN | BODY MASS INDEX: 24.96 KG/M2 | OXYGEN SATURATION: 98 % | RESPIRATION RATE: 18 BRPM | SYSTOLIC BLOOD PRESSURE: 124 MMHG | DIASTOLIC BLOOD PRESSURE: 78 MMHG | TEMPERATURE: 98 F | WEIGHT: 159 LBS

## 2025-08-11 DIAGNOSIS — R11.2 ACUTE NAUSEA WITH NONBILIOUS VOMITING: Primary | ICD-10-CM

## 2025-08-11 LAB
ALBUMIN SERPL-MCNC: 4.5 G/DL (ref 3.5–5.2)
ALBUMIN/GLOB SERPL: 1.6 G/DL
ALP SERPL-CCNC: 80 U/L (ref 39–117)
ALT SERPL W P-5'-P-CCNC: 10 U/L (ref 1–41)
ANION GAP SERPL CALCULATED.3IONS-SCNC: 13 MMOL/L (ref 5–15)
AST SERPL-CCNC: 14 U/L (ref 1–40)
BASOPHILS # BLD AUTO: 0.02 10*3/MM3 (ref 0–0.2)
BASOPHILS NFR BLD AUTO: 0.3 % (ref 0–1.5)
BILIRUB SERPL-MCNC: 0.9 MG/DL (ref 0–1.2)
BUN SERPL-MCNC: 18.2 MG/DL (ref 6–20)
BUN/CREAT SERPL: 16.3 (ref 7–25)
CALCIUM SPEC-SCNC: 10.2 MG/DL (ref 8.6–10.5)
CHLORIDE SERPL-SCNC: 99 MMOL/L (ref 98–107)
CO2 SERPL-SCNC: 28 MMOL/L (ref 22–29)
CREAT SERPL-MCNC: 1.12 MG/DL (ref 0.76–1.27)
DEPRECATED RDW RBC AUTO: 42.1 FL (ref 37–54)
EGFRCR SERPLBLD CKD-EPI 2021: 76.6 ML/MIN/1.73
EOSINOPHIL # BLD AUTO: 0 10*3/MM3 (ref 0–0.4)
EOSINOPHIL NFR BLD AUTO: 0 % (ref 0.3–6.2)
ERYTHROCYTE [DISTWIDTH] IN BLOOD BY AUTOMATED COUNT: 13 % (ref 12.3–15.4)
GLOBULIN UR ELPH-MCNC: 2.9 GM/DL
GLUCOSE SERPL-MCNC: 111 MG/DL (ref 65–99)
HCT VFR BLD AUTO: 47.7 % (ref 37.5–51)
HGB BLD-MCNC: 16.3 G/DL (ref 13–17.7)
IMM GRANULOCYTES # BLD AUTO: 0.01 10*3/MM3 (ref 0–0.05)
IMM GRANULOCYTES NFR BLD AUTO: 0.2 % (ref 0–0.5)
LIPASE SERPL-CCNC: 20 U/L (ref 13–60)
LYMPHOCYTES # BLD AUTO: 0.87 10*3/MM3 (ref 0.7–3.1)
LYMPHOCYTES NFR BLD AUTO: 13.4 % (ref 19.6–45.3)
MCH RBC QN AUTO: 30.2 PG (ref 26.6–33)
MCHC RBC AUTO-ENTMCNC: 34.2 G/DL (ref 31.5–35.7)
MCV RBC AUTO: 88.5 FL (ref 79–97)
MONOCYTES # BLD AUTO: 0.36 10*3/MM3 (ref 0.1–0.9)
MONOCYTES NFR BLD AUTO: 5.6 % (ref 5–12)
NEUTROPHILS NFR BLD AUTO: 5.21 10*3/MM3 (ref 1.7–7)
NEUTROPHILS NFR BLD AUTO: 80.5 % (ref 42.7–76)
PLATELET # BLD AUTO: 116 10*3/MM3 (ref 140–450)
PMV BLD AUTO: 11.7 FL (ref 6–12)
POTASSIUM SERPL-SCNC: 3.9 MMOL/L (ref 3.5–5.2)
PROT SERPL-MCNC: 7.4 G/DL (ref 6–8.5)
RBC # BLD AUTO: 5.39 10*6/MM3 (ref 4.14–5.8)
SODIUM SERPL-SCNC: 140 MMOL/L (ref 136–145)
WBC NRBC COR # BLD AUTO: 6.47 10*3/MM3 (ref 3.4–10.8)

## 2025-08-11 PROCEDURE — 99283 EMERGENCY DEPT VISIT LOW MDM: CPT | Performed by: EMERGENCY MEDICINE

## 2025-08-11 PROCEDURE — 80053 COMPREHEN METABOLIC PANEL: CPT | Performed by: EMERGENCY MEDICINE

## 2025-08-11 PROCEDURE — 83690 ASSAY OF LIPASE: CPT | Performed by: EMERGENCY MEDICINE

## 2025-08-11 PROCEDURE — 25810000003 LACTATED RINGERS SOLUTION: Performed by: EMERGENCY MEDICINE

## 2025-08-11 PROCEDURE — 85025 COMPLETE CBC W/AUTO DIFF WBC: CPT | Performed by: EMERGENCY MEDICINE

## 2025-08-11 PROCEDURE — 96374 THER/PROPH/DIAG INJ IV PUSH: CPT

## 2025-08-11 PROCEDURE — 25010000002 ONDANSETRON PER 1 MG: Performed by: EMERGENCY MEDICINE

## 2025-08-11 RX ORDER — SODIUM CHLORIDE 0.9 % (FLUSH) 0.9 %
10 SYRINGE (ML) INJECTION AS NEEDED
Status: DISCONTINUED | OUTPATIENT
Start: 2025-08-11 | End: 2025-08-11 | Stop reason: HOSPADM

## 2025-08-11 RX ORDER — ONDANSETRON 4 MG/1
4 TABLET, ORALLY DISINTEGRATING ORAL EVERY 8 HOURS PRN
Qty: 10 TABLET | Refills: 0 | Status: SHIPPED | OUTPATIENT
Start: 2025-08-11

## 2025-08-11 RX ORDER — ONDANSETRON 2 MG/ML
4 INJECTION INTRAMUSCULAR; INTRAVENOUS ONCE
Status: COMPLETED | OUTPATIENT
Start: 2025-08-11 | End: 2025-08-11

## 2025-08-11 RX ADMIN — ONDANSETRON 4 MG: 2 INJECTION INTRAMUSCULAR; INTRAVENOUS at 18:54

## 2025-08-11 RX ADMIN — SODIUM CHLORIDE, POTASSIUM CHLORIDE, SODIUM LACTATE AND CALCIUM CHLORIDE 1000 ML: 600; 310; 30; 20 INJECTION, SOLUTION INTRAVENOUS at 18:53
